# Patient Record
Sex: MALE | HISPANIC OR LATINO | URBAN - METROPOLITAN AREA
[De-identification: names, ages, dates, MRNs, and addresses within clinical notes are randomized per-mention and may not be internally consistent; named-entity substitution may affect disease eponyms.]

---

## 2019-01-28 ENCOUNTER — OFFICE VISIT (OUTPATIENT)
Dept: URBAN - METROPOLITAN AREA CLINIC 17 | Facility: CLINIC | Age: 34
End: 2019-01-28
Payer: OTHER GOVERNMENT

## 2019-01-28 DIAGNOSIS — H26.8 OTHER SPECIFIED CATARACT: Primary | ICD-10-CM

## 2019-01-28 PROCEDURE — 99213 OFFICE O/P EST LOW 20 MIN: CPT | Performed by: OPHTHALMOLOGY

## 2019-01-28 RX ORDER — PREDNISOLONE ACETATE 10 MG/ML
1 % SUSPENSION/ DROPS OPHTHALMIC
Qty: 1 | Refills: 1 | Status: INACTIVE
Start: 2019-01-28 | End: 2019-04-02

## 2019-01-28 RX ORDER — MOXIFLOXACIN HYDROCHLORIDE 5 MG/ML
0.5 % SOLUTION/ DROPS OPHTHALMIC
Qty: 1 | Refills: 0 | Status: INACTIVE
Start: 2019-01-28 | End: 2019-04-02

## 2019-01-28 ASSESSMENT — INTRAOCULAR PRESSURE
OD: 14
OS: 14

## 2019-01-28 ASSESSMENT — VISUAL ACUITY
OS: LP
OD: HM

## 2019-01-28 ASSESSMENT — KERATOMETRY
OD: 43.50
OS: 40.75

## 2019-01-28 NOTE — IMPRESSION/PLAN
Impression: Other specified cataract: H26.8. OU. White cataract OU. Vision: vision affected. Symptoms: could improve with surgery. Plan: Cataract accounts for patient's complaints. Reviewed risks, benefits, and procedure. Patient desires surgery, schedule ce/iol OD then OS, RL2, standard IOL, distance refractive target, patient is clear for surgery in [facility]. Will need trypan blue and Malyugin ring [id number]. Patient understands vision outcome after surgery is unknown due to dense cataract, unsure if any retinopathy is present.

## 2019-02-27 ENCOUNTER — PRE-OPERATIVE VISIT (OUTPATIENT)
Dept: URBAN - METROPOLITAN AREA CLINIC 17 | Facility: CLINIC | Age: 34
End: 2019-02-27
Payer: OTHER GOVERNMENT

## 2019-02-27 PROCEDURE — 92136 OPHTHALMIC BIOMETRY: CPT | Performed by: OPHTHALMOLOGY

## 2019-02-27 ASSESSMENT — PACHYMETRY
OS: 26.20
OD: 3.48
OS: 3.15
OD: 25.93

## 2019-03-25 ENCOUNTER — SURGERY (OUTPATIENT)
Dept: URBAN - METROPOLITAN AREA SURGERY 7 | Facility: SURGERY | Age: 34
End: 2019-03-25
Payer: OTHER GOVERNMENT

## 2019-04-08 ENCOUNTER — SURGERY (OUTPATIENT)
Dept: URBAN - METROPOLITAN AREA SURGERY 7 | Facility: SURGERY | Age: 34
End: 2019-04-08
Payer: OTHER GOVERNMENT